# Patient Record
(demographics unavailable — no encounter records)

---

## 2024-11-14 NOTE — ASSESSMENT
[FreeTextEntry1] : Fatigue: recommend increased hydration, low int exercise, improved sleep hygiene, f/u labwork including CBC/CMP/A1c/TSH ordered T2DM: a1c 8/24 reviewed, recommend low carb diet, low int stretching, f/u a1c ordered, recommend annual ophthalmology eval HTN: bp reviewed, c/w losartan 25 mg po daily, recommend low salt diet Anxiety: recommend meditation, yoga, exercise, start sertraline 25 mg po daily Pain in feet: intermittent, no s/s inflammation, no swelilng/edema/erythema, start diclofenac 1% gel bid prn for pain RTC 2 wks

## 2024-11-14 NOTE — REVIEW OF SYSTEMS
[Fever] : no fever [Chills] : no chills [Fatigue] : fatigue [Suicidal] : not suicidal [Anxiety] : anxiety [Depression] : no depression [Negative] : Neurological

## 2024-11-14 NOTE — PHYSICAL EXAM
[Normal] : normal gait, coordination grossly intact, no focal deficits and deep tendon reflexes were 2+ and symmetric [Anxious] : anxious

## 2024-11-14 NOTE — HISTORY OF PRESENT ILLNESS
[FreeTextEntry1] : Follow up  [de-identified] : 85 yo female presents with complaint of fatigue. She notes thats its been going on for the past 3 weeks. In addition, she notes significant anxiety and stress about health issues. She notes pain in feet at times. Denies fever, chills, cp, palpitations, sob, nvcd.

## 2024-12-05 NOTE — CARDIOLOGY SUMMARY
[de-identified] : SR, normal EKG  [de-identified] : 2/7/23: 0.5mm horizontal ST depression in aVF, V5 and V6, no ischemia/infarct   [de-identified] : 2/16/23: LVEF 60-65%, mild to mod AI, shunt at atrial level

## 2024-12-05 NOTE — HISTORY OF PRESENT ILLNESS
[FreeTextEntry1] : Patient is Shailesh speaking, accompanied by her son who is acting as    85 y/o F with PMH CAD s/p PCI x 1 in 2019 at [facility] (no records available for review), HTN, DM2, HLD, ROGER. Was sent for TTE and NST for further evaluation of exertional fatigue, which were negative. 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 Hospital course c/b sepsis/PNA/UTI.   Patient was previously seen by me 5/23/24. Was restarted on ASA, statin (patient self-dc'ed) due to history of CAD  Following this visit, she was admitted to [facility] in 6/2024 with CVA per son in law. No records available for review. Denies residual weakness, ambulates with cane She is currently in her usual state of health. Denies CP, dyspnea, palpitations, dizziness, lightheadedness, or LE edema. C/o uncontrolled blood sugar (fasting glucose >160) and constipation.  Patient's son reports frequent noncompliance with medication and follow ups

## 2024-12-05 NOTE — DISCUSSION/SUMMARY
[___ Month(s)] : in [unfilled] month(s) [FreeTextEntry1] : 85 y/o F with PMH CAD s/p PCI x 1 in 2019 at [facility] (no records available for review), HTN, DM2, HLD, ROGER, presents for followup. Admitted to 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 Recent CVA at [facility] 6/2024.   #CVA  Request records from [facility]  Continue ASA, statin  Follow up with neurology  4 week MCOT to assess for arrhythmia  Check carotid dopplers   #CAD s/p PCI Stable, asymptomatic NST 2/2023 negative for ischemia/infarct Continue ASA, statin  #AI  Repeat TTE  #HTN Above goal, son in law reports medication noncompliance  Continue losartan 25mg daily Medication compliance encouraged  DASH diet discussed  #HLD Last  6/2024; patient was restarted on lipitor Repeat lipid panel   Recommend PCP followup for management of DM and constipation; trial miralax  [EKG obtained to assist in diagnosis and management of assessed problem(s)] : EKG obtained to assist in diagnosis and management of assessed problem(s)

## 2024-12-13 NOTE — ASSESSMENT
[FreeTextEntry1] : HTN: elevated bp, pt reports good compliance at this time with losartan, increase losartan to 50 mg po daily, recommend low salt diet Fatigue: improved, may be related to iron def, start ferrous sulfate daily, repeat in 6 wks ROGER: no melena/hematochezia, seen by hematology, colonoscopy 2022 with 5 mm sessile polyp removed, start ferrous sulfate daily, repeat in 6 wks RTC 4 wks

## 2024-12-13 NOTE — HISTORY OF PRESENT ILLNESS
[FreeTextEntry1] : follow up [de-identified] : 85 yo female presents for follow up of fatigue. she notes some improvement since changing diet. She is following up on iron def, HTN. Denies fever, chills, cp, palpitations, sob, nvcd.

## 2025-03-07 NOTE — ASSESSMENT
[FreeTextEntry1] : Fatigue: recommend increased hydration, f/u labwork including CBC/iron studies/b12/folate ordered T2DM: recommend low carb diet, low int stretching, f/u a1c ordered, c/w metformin 1000 mg po bid, f/u ophthalmology HLD: recommend low fat diet, f/u lipid profile ordered, c/w atorvastatin 40 mg po daily Anxiety: recommend c/w sertraline 25 mg po daily, low int stretching ROGER: f/u repeat iron studies ordered, c/w iron supplement, no melena/hematochezia RTC 2 wks

## 2025-03-07 NOTE — HISTORY OF PRESENT ILLNESS
[FreeTextEntry1] : follow up [de-identified] : 83 yo female presents for follow up of iron def. she is compliant with iron supplement. She does report feeling fatigued. In addition, she is following up on T2DM, HLD. Denies fever, chills, cp, palpitations, sob, nvcd.

## 2025-03-12 NOTE — DISCUSSION/SUMMARY
[___ Month(s)] : in [unfilled] month(s) [FreeTextEntry1] : 85 y/o F with PMH CAD s/p PCI x 1 in 2019 at [facility] (no records available for review), HTN, DM2, HLD, ROGER, Guillian Fordoche syndrome, CVA with no residual.   #Lacunar CVA  Continue ASA, statin  4 week MCOT ordered, patient only wore for 5 days. No AF detected MRA neck limited, no carotid disease proximally. Check carotid dopplers for further assessment  Has known interatrial shunt (?ASD vs PFO) however as CVA was lacunar and due to patient's advanced age, would not benefit from closure at this time   #CAD s/p PCI Stable, asymptomatic NST 2/2023 negative for ischemia/infarct Continue ASA, statin  #AI  Repeat TTE at [facility] with mild AI   #HTN Manual BP controlled Continue losartan 25mg, amlodipine 5mg daily Medication compliance encouraged  DASH diet discussed  #HLD Last LDL40 3/2025 Continue lipitor  [EKG obtained to assist in diagnosis and management of assessed problem(s)] : EKG obtained to assist in diagnosis and management of assessed problem(s)

## 2025-03-12 NOTE — HISTORY OF PRESENT ILLNESS
[FreeTextEntry1] : Patient is Shailesh speaking, accompanied by her son who is acting as    83 y/o F with PMH CAD s/p PCI x 1 in 2019 at [facility] (no records available for review), HTN, DM2, HLD, ROGER, CVA with no residual. Had TTE and NST 2/2023 for evaluation of exertional fatigue, which were negative. Was admitted to [facility] in 6/2023 with inability to walk; had probable Guillian Elizabeth syndrome. Was later admitted to [facility] 6/2024 with acute CVA.   Patient was previously seen by me 12/5/24 following hospitalization at [facility] in 6/2024 for acute lacunar CVA. Hospital records were received and reviewed.  Ordered 4w MCOT, however patient only wore monitor for 5 days. Showed SR, no AF. Carotid dopplers were ordered but haven't been done  Patient is currently in her usual state of health. Denies CP, dyspnea, palpitations, dizziness, lightheadedness, or LE edema. She complains of general weakness/fatigue, is undergoing workup with her PCP. She denies exertional symptoms  Was previously noncompliant with medication, but her son says she is now compliant Generally sedentary, does not exercise

## 2025-06-04 NOTE — ASSESSMENT
[FreeTextEntry1] : Accidental fall/left hip pain: f/u xray hip/femur stat, start tylenol prn RTC 1 wk

## 2025-06-04 NOTE — PHYSICAL EXAM
[Normal] : affect was normal and insight and judgment were intact [de-identified] : left hip ROM intact, motor strength 5/5, sensation intact

## 2025-06-04 NOTE — HISTORY OF PRESENT ILLNESS
[FreeTextEntry8] : 84 yo female presents with concern for left hip pain, she notes that at the ophthalmologists office yesterday she was getting off of the bed and she tripped over the step. She fell on her left hip. she notes pain around hip. Pain is 7/10 in severity, sharp, worse with use. Denies fever, chills, cp, palpitations, sob, nvcd.

## 2025-06-23 NOTE — ASSESSMENT
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  I have told her to use the Plavix for another month then she can stop it. Stressed the importance of controlling risk factors including blood pressure, cholesterol and blood sugar.  Follow-up here in 1 year and by phone prior to that if needed

## 2025-06-23 NOTE — HISTORY OF PRESENT ILLNESS
[FreeTextEntry1] : I saw her initially 8/1/2023 with the following history. She is here with her son-in-law who interprets.  Patient speaks Shailesh Patient was admitted to [facility] the middle of June.  No records available.  According to the son-in-law she stopped eating and became unable to walk.  She spent 3 to 4 weeks in the hospital.  He says she developed pneumonia UTI and possible sepsis.  She subsequently went to rehab.  She has improved a little but persists with inability to walk and bilateral leg weakness.  Apparently had no imaging studies done in the hospital of the brain or spine and no neurological evaluation.  Prior to this she was functioning normally and was very active.  She has not had any cognitive decline.  She has no pain in the neck, back, or elsewhere.  There is no bowel or bladder dysfunction.  There are no visual changes.  There is no trouble with speech or swallowing.  Her medical history is significant for diabetes and hypertension.  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  Able to ambulate with a cane Having a lot of pain in the legs, on gabapentin 300 mg twice a day  Visit , 4/10/2024 She is here with her son-in-law who translates She has completed physical therapy and is doing her own exercises with ongoing improvement in strength She is able to ambulate independently although uses a cane most of the time Having a lot of pain in the feet, now on gabapentin 600 mg 3 times a day which is helping some.  Visit today, 8/5/2024: She is here with her son-in-law who interprets The pain in her feet is totally resolved and she stopped the gabapentin on her own and remains pain-free. She is ambulating with a cane but can ambulate independently  On 6/18/2024 she developed some transient left-sided weakness which resolved after a short period of time.  She was seen in [facility] emergency department and MRI showed a stroke on the right side.  No records available. She was discharged on 81 mg aspirin, Plavix for short period of time and increased dose of statin.  6/3/2025: She is here with her son in law She remains pain-free She remains neurologically stable Has some mild imbalance and gait difficulty.

## 2025-06-23 NOTE — PHYSICAL EXAM
[FreeTextEntry1] : On examination the patient is alert and cooperative No cranial nerve deficit Arm strength normal Leg strength essentially normal Sensation intact Reflexes absent Ambulating well with a cane and able to ambulate short distance independently in the office.

## 2025-07-03 NOTE — DISCUSSION/SUMMARY
[de-identified] : Patient overall doing well with regard to a fall approximately 1 month ago I am going to lumbar MRI this patient to make sure there is no noncontiguous lumbar compression fractures.  She is also complaining of right superior buttock pain and evaluate with an MRI of the pelvis to make sure there is no right sacral alar involvement.  MRI lumbar spine MRI sacrum/pelvis to further evaluate this alar fracture.  Patient will follow-up afterwards physical therapy is also been initiated

## 2025-07-03 NOTE — HISTORY OF PRESENT ILLNESS
[de-identified] : Very pleasant 85-year-old female presents for evaluation of low back pain and right sacral alar pain.  She presents with left hip imaging because of a fall onto her left hip but on today's exam she is very clear and her son is very clear she has worsening pain and/or pain over the right posterior buttock area.  States the pain started on the left is now since resolved CAT scan determined and sacral ala fracture on the left but again to reiterate*primary complaint is right superior buttock pain.  Pain is reproducible with touch which makes the possibility of a myositis/gluteal tendinopathy type presentation [Improving] : improving [___ mths] : [unfilled] month(s) ago [3] : a current pain level of 3/10 [2] : an average pain level of 2/10 [1] : a minimum pain level of 1/10 [10] : a maximum pain level of 10/10 [Prolonged Standing] : worsened by prolonged standing [Standing] : worsened by standing [Walking] : worsened by walking [Rest] : relieved by rest [Ataxia] : no ataxia [Incontinence] : no incontinence [Urinary Ret.] : no urinary retention

## 2025-07-03 NOTE — PHYSICAL EXAM
[Antalgic] : antalgic [Wheelchair] : uses a wheelchair [de-identified] : CONSTITUTIONAL: The patient is a very pleasant individual who is well-nourished and who appears stated age. PSYCHIATRIC: The patient is alert and oriented X 3 and in no apparent distress, and participates with orthopedic evaluation well. HEAD: Atraumatic and is nonsyndromic in appearance. EENT: No visible thyromegaly, EOMI. RESPIRATORY: Respiratory rate is regular, not dyspneic on examination. LYMPHATICS: There is no inguinal lymphadenopathy INTEGUMENTARY: Skin is clean, dry, and intact about the bilateral lower extremities and lumbar spine. VASCULAR: There is brisk capillary refill about the bilateral lower extremities. NEUROLOGIC: There are no pathologic reflexes. There is no decrease in sensation of the bilateral lower extremities on manual  examination. Deep tendon reflexes are well maintained at 2+/4 of the bilateral lower extremities and are symmetric.. MUSCULOSKELETAL: There is no visible muscular atrophy. Manual motor strength is well maintained in the bilateral lower extremities. Range of motion of lumbar spine is well maintained. The patient ambulates in a non-myelopathic manner. Negative tension sign and straight leg raise bilaterally. Quad extension, ankle dorsiflexion, EHL, plantar flexion, and ankle eversion are well preserved. Normal secondary orthopaedic exam of bilateral hips, greater trochanteric area, knees and ankles, mechanically orientate her low back pain considered consistent with a sacral alar fracture.  But again her pain is reproducible over the right superior buttock area may be consistent with gluteal tendinopathy. [de-identified] : CAT scans of the left hip been reviewed identifying a left sacral alar fracture no intra-articular hip fractures.  X-rays of the hip and femur have also been reviewed showing no acute fractures.  Left sacral fracture may be chronic because of sclerotic changes within the fracture margin.  X-rays taken on today's date of the lumbar spine shows no significant vertical disruption of the sacral ala no acute lumbar compression fracture sacral and coccyx x-rays been reviewed showing no sacrococcygeal displacement.

## 2025-07-15 NOTE — PHYSICAL EXAM
[Antalgic] : antalgic [de-identified] : CONSTITUTIONAL: The patient is a very pleasant individual who is well-nourished and who appears stated age. PSYCHIATRIC: The patient is alert and oriented X 3 and in no apparent distress, and participates with orthopedic evaluation well. HEAD: Atraumatic and is nonsyndromic in appearance. EENT: No visible thyromegaly, EOMI. RESPIRATORY: Respiratory rate is regular, not dyspneic on examination. LYMPHATICS: There is no inguinal lymphadenopathy INTEGUMENTARY: Skin is clean, dry, and intact about the bilateral lower extremities and lumbar spine. VASCULAR: There is brisk capillary refill about the bilateral lower extremities. NEUROLOGIC: There are no pathologic reflexes. There is no decrease in sensation of the bilateral lower extremities on manual  examination. Deep tendon reflexes are well maintained at 2+/4 of the bilateral lower extremities and are symmetric.. MUSCULOSKELETAL: There is no visible muscular atrophy. Manual motor strength is well maintained in the bilateral lower extremities. Range of motion of lumbar spine is well maintained. The patient ambulates in a non-myelopathic manner. Negative tension sign and straight leg raise bilaterally. Quad extension, ankle dorsiflexion, EHL, plantar flexion, and ankle eversion are well preserved. Normal secondary orthopaedic exam of bilateral hips, greater trochanteric area, knees and ankles, well-controlled mechanically orientate of pelvic pain.  Pain is isolated posteriorly over the sacral ala as well as pubic rami. [de-identified] : Pelvic and lumbar MRIs have been reviewed from [facility] showing acute bilateral sacral alar fractures and thankfully there is no acute lumbar compression fractures.  There is also a pubic rami fracture as well.  Previous lumbar x-rays have been reviewed showing no significant vertical displacement of the sacral ala

## 2025-07-15 NOTE — HISTORY OF PRESENT ILLNESS
[de-identified] : Very pleasant 85-year-old female presents for an MRI evaluation with regard to a left sacral alar fracture MRI of the pelvis of the lumbar spine to make sure there is no noncontiguous fractures in the lumbar spine as well as additional pelvic fractures.  Patient and her family states she is gradually improving she is now able to freely stand from a wheelchair and ambulate.  Interested in starting physical therapy pain controlled with naproxen [Improving] : improving [2] : a current pain level of 2/10 [1] : an average pain level of 1/10 [0] : a minimum pain level of 0/10 [10] : a maximum pain level of 10/10 [Intermit.] : ~He/She~ states the symptoms seem to be intermittent [Prolonged Standing] : worsened by prolonged standing [Weight Bearing] : worsened by weight bearing [Rest] : relieved by rest [Ataxia] : no ataxia [Incontinence] : no incontinence [Loss of Dexterity] : good dexterity [Urinary Ret.] : no urinary retention [de-identified] : Rolling from left to right while in bed

## 2025-07-15 NOTE — DISCUSSION/SUMMARY
[de-identified] : Based upon her pain that is now controlled she is starting to freely ambulate MRIs that showed nondisplaced bilateral sacral alar fractures as well as a pubic rami fracture continue with conservative care naproxen Tylenol for pain weightbearing as tolerated initiate physical therapy for gait and balance training patient is stephen follow-up in approximately 1 month.